# Patient Record
Sex: MALE | Race: WHITE | NOT HISPANIC OR LATINO | Employment: PART TIME | URBAN - METROPOLITAN AREA
[De-identification: names, ages, dates, MRNs, and addresses within clinical notes are randomized per-mention and may not be internally consistent; named-entity substitution may affect disease eponyms.]

---

## 2023-03-20 ENCOUNTER — APPOINTMENT (OUTPATIENT)
Dept: PRIMARY CARE | Facility: CLINIC | Age: 28
End: 2023-03-20
Payer: COMMERCIAL

## 2023-12-14 ENCOUNTER — OFFICE VISIT (OUTPATIENT)
Dept: PRIMARY CARE | Facility: CLINIC | Age: 28
End: 2023-12-14

## 2023-12-14 VITALS
TEMPERATURE: 97.9 F | RESPIRATION RATE: 18 BRPM | OXYGEN SATURATION: 98 % | BODY MASS INDEX: 21.5 KG/M2 | DIASTOLIC BLOOD PRESSURE: 72 MMHG | WEIGHT: 137 LBS | SYSTOLIC BLOOD PRESSURE: 116 MMHG | HEIGHT: 67 IN | HEART RATE: 68 BPM

## 2023-12-14 DIAGNOSIS — F98.8 ATTENTION DEFICIT DISORDER, UNSPECIFIED HYPERACTIVITY PRESENCE: Primary | ICD-10-CM

## 2023-12-14 PROCEDURE — 1036F TOBACCO NON-USER: CPT | Performed by: FAMILY MEDICINE

## 2023-12-14 PROCEDURE — 99213 OFFICE O/P EST LOW 20 MIN: CPT | Performed by: FAMILY MEDICINE

## 2023-12-14 RX ORDER — DEXTROAMPHETAMINE SULFATE, DEXTROAMPHETAMINE SACCHARATE, AMPHETAMINE SULFATE AND AMPHETAMINE ASPARTATE 7.5; 7.5; 7.5; 7.5 MG/1; MG/1; MG/1; MG/1
CAPSULE, EXTENDED RELEASE ORAL EVERY 24 HOURS
COMMUNITY
Start: 2023-02-09 | End: 2023-12-14 | Stop reason: SDUPTHER

## 2023-12-14 RX ORDER — DEXTROAMPHETAMINE SULFATE, DEXTROAMPHETAMINE SACCHARATE, AMPHETAMINE SULFATE AND AMPHETAMINE ASPARTATE 7.5; 7.5; 7.5; 7.5 MG/1; MG/1; MG/1; MG/1
30 CAPSULE, EXTENDED RELEASE ORAL EVERY 24 HOURS
Qty: 90 CAPSULE | Refills: 0 | Status: SHIPPED | OUTPATIENT
Start: 2023-12-31 | End: 2023-12-27 | Stop reason: SDUPTHER

## 2023-12-14 ASSESSMENT — PATIENT HEALTH QUESTIONNAIRE - PHQ9
SUM OF ALL RESPONSES TO PHQ9 QUESTIONS 1 AND 2: 0
1. LITTLE INTEREST OR PLEASURE IN DOING THINGS: NOT AT ALL
2. FEELING DOWN, DEPRESSED OR HOPELESS: NOT AT ALL

## 2023-12-14 ASSESSMENT — PAIN SCALES - GENERAL: PAINLEVEL: 0-NO PAIN

## 2023-12-14 NOTE — PROGRESS NOTES
"Subjective   Patient ID: Darryn Weber is a 28 y.o. male who presents for medication refills (Pt here for medication refills).    HPI     Review of Systems    Objective   /72 (BP Location: Left arm, Patient Position: Sitting, BP Cuff Size: Adult)   Pulse 68   Temp 36.6 °C (97.9 °F) (Temporal)   Resp 18   Ht 1.689 m (5' 6.5\")   Wt 62.1 kg (137 lb)   SpO2 98%   BMI 21.78 kg/m²     Physical Exam  Constitutional:       General: He is not in acute distress.     Appearance: Normal appearance.   Cardiovascular:      Rate and Rhythm: Normal rate and regular rhythm.      Heart sounds: No murmur heard.  Pulmonary:      Breath sounds: Normal breath sounds. No wheezing.   Neurological:      Mental Status: He is alert.         Assessment/Plan   Problem List Items Addressed This Visit             ICD-10-CM    Attention deficit disorder - Primary F98.8    Relevant Medications    Adderall XR 30 mg 24 hr capsule (Start on 12/31/2023)          "

## 2023-12-27 DIAGNOSIS — F98.8 ATTENTION DEFICIT DISORDER, UNSPECIFIED HYPERACTIVITY PRESENCE: ICD-10-CM

## 2023-12-29 RX ORDER — DEXTROAMPHETAMINE SULFATE, DEXTROAMPHETAMINE SACCHARATE, AMPHETAMINE SULFATE AND AMPHETAMINE ASPARTATE 7.5; 7.5; 7.5; 7.5 MG/1; MG/1; MG/1; MG/1
30 CAPSULE, EXTENDED RELEASE ORAL EVERY 24 HOURS
Qty: 90 CAPSULE | Refills: 0 | Status: SHIPPED | OUTPATIENT
Start: 2023-12-31

## 2023-12-30 RX ORDER — DEXTROAMPHETAMINE SACCHARATE, AMPHETAMINE ASPARTATE MONOHYDRATE, DEXTROAMPHETAMINE SULFATE AND AMPHETAMINE SULFATE 7.5; 7.5; 7.5; 7.5 MG/1; MG/1; MG/1; MG/1
30 CAPSULE, EXTENDED RELEASE ORAL
Qty: 90 CAPSULE | OUTPATIENT
Start: 2023-12-30

## 2024-01-02 DIAGNOSIS — F98.8 ATTENTION DEFICIT DISORDER, UNSPECIFIED HYPERACTIVITY PRESENCE: ICD-10-CM

## 2024-01-02 RX ORDER — DEXTROAMPHETAMINE SACCHARATE, AMPHETAMINE ASPARTATE MONOHYDRATE, DEXTROAMPHETAMINE SULFATE AND AMPHETAMINE SULFATE 7.5; 7.5; 7.5; 7.5 MG/1; MG/1; MG/1; MG/1
30 CAPSULE, EXTENDED RELEASE ORAL EVERY 24 HOURS
Qty: 90 CAPSULE | Refills: 0 | OUTPATIENT
Start: 2024-01-02

## 2024-01-05 DIAGNOSIS — F98.8 ATTENTION DEFICIT DISORDER, UNSPECIFIED HYPERACTIVITY PRESENCE: ICD-10-CM

## 2024-01-05 RX ORDER — DEXTROAMPHETAMINE SACCHARATE, AMPHETAMINE ASPARTATE MONOHYDRATE, DEXTROAMPHETAMINE SULFATE AND AMPHETAMINE SULFATE 7.5; 7.5; 7.5; 7.5 MG/1; MG/1; MG/1; MG/1
30 CAPSULE, EXTENDED RELEASE ORAL EVERY 24 HOURS
Qty: 90 CAPSULE | Refills: 0 | OUTPATIENT
Start: 2024-01-05

## 2024-12-24 ENCOUNTER — APPOINTMENT (OUTPATIENT)
Dept: RADIOLOGY | Facility: HOSPITAL | Age: 29
End: 2024-12-24
Payer: COMMERCIAL

## 2024-12-24 ENCOUNTER — HOSPITAL ENCOUNTER (EMERGENCY)
Facility: HOSPITAL | Age: 29
Discharge: HOME | End: 2024-12-24
Payer: COMMERCIAL

## 2024-12-24 VITALS
OXYGEN SATURATION: 100 % | DIASTOLIC BLOOD PRESSURE: 74 MMHG | SYSTOLIC BLOOD PRESSURE: 124 MMHG | RESPIRATION RATE: 16 BRPM | HEART RATE: 57 BPM | TEMPERATURE: 98.7 F | HEIGHT: 66 IN | BODY MASS INDEX: 21.69 KG/M2 | WEIGHT: 135 LBS

## 2024-12-24 DIAGNOSIS — S83.91XA SPRAIN OF RIGHT KNEE, INITIAL ENCOUNTER: Primary | ICD-10-CM

## 2024-12-24 DIAGNOSIS — S80.01XA CONTUSION OF RIGHT KNEE, INITIAL ENCOUNTER: ICD-10-CM

## 2024-12-24 PROCEDURE — 73564 X-RAY EXAM KNEE 4 OR MORE: CPT | Mod: RT

## 2024-12-24 PROCEDURE — 99283 EMERGENCY DEPT VISIT LOW MDM: CPT

## 2024-12-24 PROCEDURE — 2500000001 HC RX 250 WO HCPCS SELF ADMINISTERED DRUGS (ALT 637 FOR MEDICARE OP): Performed by: NURSE PRACTITIONER

## 2024-12-24 PROCEDURE — 73564 X-RAY EXAM KNEE 4 OR MORE: CPT | Mod: RIGHT SIDE | Performed by: RADIOLOGY

## 2024-12-24 RX ORDER — ACETAMINOPHEN 325 MG/1
975 TABLET ORAL ONCE
Status: COMPLETED | OUTPATIENT
Start: 2024-12-24 | End: 2024-12-24

## 2024-12-24 RX ORDER — IBUPROFEN 600 MG/1
600 TABLET ORAL ONCE
Status: COMPLETED | OUTPATIENT
Start: 2024-12-24 | End: 2024-12-24

## 2024-12-24 RX ORDER — TIZANIDINE 2 MG/1
4 TABLET ORAL 2 TIMES DAILY
Qty: 28 TABLET | Refills: 0 | Status: SHIPPED | OUTPATIENT
Start: 2024-12-24 | End: 2024-12-31

## 2024-12-24 RX ORDER — TIZANIDINE 2 MG/1
4 TABLET ORAL 2 TIMES DAILY
Qty: 28 TABLET | Refills: 0 | Status: SHIPPED | OUTPATIENT
Start: 2024-12-24 | End: 2024-12-24

## 2024-12-24 RX ORDER — NAPROXEN 500 MG/1
500 TABLET ORAL
Qty: 14 TABLET | Refills: 0 | Status: SHIPPED | OUTPATIENT
Start: 2024-12-24 | End: 2024-12-31

## 2024-12-24 RX ORDER — NAPROXEN 500 MG/1
500 TABLET ORAL
Qty: 14 TABLET | Refills: 0 | Status: SHIPPED | OUTPATIENT
Start: 2024-12-24 | End: 2024-12-24

## 2024-12-24 RX ORDER — DICLOFENAC SODIUM 10 MG/G
4 GEL TOPICAL 4 TIMES DAILY
Qty: 20 G | Refills: 0 | Status: SHIPPED | OUTPATIENT
Start: 2024-12-24

## 2024-12-24 RX ORDER — DICLOFENAC SODIUM 10 MG/G
4 GEL TOPICAL 4 TIMES DAILY
Qty: 20 G | Refills: 0 | Status: SHIPPED | OUTPATIENT
Start: 2024-12-24 | End: 2024-12-24

## 2024-12-24 RX ADMIN — IBUPROFEN 600 MG: 600 TABLET, FILM COATED ORAL at 08:21

## 2024-12-24 RX ADMIN — ACETAMINOPHEN 975 MG: 325 TABLET, FILM COATED ORAL at 08:21

## 2024-12-24 ASSESSMENT — PAIN - FUNCTIONAL ASSESSMENT: PAIN_FUNCTIONAL_ASSESSMENT: 0-10

## 2024-12-24 ASSESSMENT — PAIN DESCRIPTION - LOCATION: LOCATION: KNEE

## 2024-12-24 ASSESSMENT — PAIN DESCRIPTION - PAIN TYPE: TYPE: ACUTE PAIN

## 2024-12-24 ASSESSMENT — PAIN DESCRIPTION - DESCRIPTORS: DESCRIPTORS: ACHING

## 2024-12-24 ASSESSMENT — COLUMBIA-SUICIDE SEVERITY RATING SCALE - C-SSRS
2. HAVE YOU ACTUALLY HAD ANY THOUGHTS OF KILLING YOURSELF?: NO
1. IN THE PAST MONTH, HAVE YOU WISHED YOU WERE DEAD OR WISHED YOU COULD GO TO SLEEP AND NOT WAKE UP?: NO
6. HAVE YOU EVER DONE ANYTHING, STARTED TO DO ANYTHING, OR PREPARED TO DO ANYTHING TO END YOUR LIFE?: NO

## 2024-12-24 ASSESSMENT — PAIN DESCRIPTION - FREQUENCY: FREQUENCY: CONSTANT/CONTINUOUS

## 2024-12-24 ASSESSMENT — PAIN SCALES - GENERAL: PAINLEVEL_OUTOF10: 8

## 2024-12-24 ASSESSMENT — PAIN DESCRIPTION - ORIENTATION: ORIENTATION: RIGHT

## 2024-12-24 NOTE — DISCHARGE INSTRUCTIONS
We have an orthopedic walk-in clinic on the old side of our hospital second floor.  They are open Monday through Friday 8 30-4 30.  I have also requested an appointment with our on-call orthopedic surgeon.  She is fantastic.  Please use tizanidine, Naprosyn, and diclofenac cream for pain management.

## 2024-12-24 NOTE — Clinical Note
Darryn Weber was seen and treated in our emergency department on 12/24/2024.  He may return to work on 12/27/2024.       If you have any questions or concerns, please don't hesitate to call.      David Moses, APRN-CNP

## 2024-12-24 NOTE — ED PROVIDER NOTES
HPI   Chief Complaint   Patient presents with    Knee Pain       29-year-old male presents today with acute right knee injury when he was finishing work.  He slipped and fell landing and striking a table with his right knee.  He has been no weight or at the [employer] company for the last 3 years.  Is a job he thoroughly enjoys.  He denies paresthesia.  She endorses limited range of motion.  He denies any change in skin temperature or color but the anterior aspect of his right knee he endorses swelling.  He denies hip pain.  He denies ankle pain.  He denies foot pain.  He is not on any anticoagulant medication.  The only medication he is on is Adderall and he has been on Adderall most of his childhood and adult life.  He denies any alcohol, smoking, or drug abuse.      History provided by:  Patient   used: No            Patient History   Past Medical History:   Diagnosis Date    ADHD (attention deficit hyperactivity disorder)      No past surgical history on file.  Family History   Problem Relation Name Age of Onset    No Known Problems Mother      No Known Problems Father       Social History     Tobacco Use    Smoking status: Never    Smokeless tobacco: Never   Vaping Use    Vaping status: Never Used   Substance Use Topics    Alcohol use: Never    Drug use: Never       Physical Exam   ED Triage Vitals [12/24/24 0751]   Temperature Heart Rate Respirations BP   37.1 °C (98.7 °F) 57 16 124/74      Pulse Ox Temp Source Heart Rate Source Patient Position   100 % Temporal -- --      BP Location FiO2 (%)     -- --       Physical Exam  Constitutional:       Appearance: Normal appearance.   HENT:      Head: Normocephalic and atraumatic.   Cardiovascular:      Rate and Rhythm: Normal rate and regular rhythm.      Pulses: Normal pulses.      Heart sounds: Normal heart sounds.   Pulmonary:      Effort: Pulmonary effort is normal.      Breath sounds: Normal breath sounds.   Abdominal:      General: Abdomen  is flat.      Palpations: Abdomen is soft.   Musculoskeletal:         General: Swelling and tenderness present. Normal range of motion.      Cervical back: Normal range of motion and neck supple.      Comments: Drawer test negative.  Negative laxity.  There is swelling to the anterior aspect of the right patella.  Pedal posterior tibial pulse 2/2.  Cap refill less than 2 seconds   Skin:     General: Skin is warm.      Capillary Refill: Capillary refill takes less than 2 seconds.   Neurological:      General: No focal deficit present.      Mental Status: He is alert and oriented to person, place, and time.           ED Course & MDM   Diagnoses as of 12/24/24 1012   Sprain of right knee, initial encounter   Contusion of right knee, initial encounter                 No data recorded     [place] Coma Scale Score: 15 (12/24/24 0751 : Peña Lorenzana RN)                           Medical Decision Making  Patient received Motrin, Tylenol and ice.  I requested Ace wrap and crutches.  I do not believe patient could tolerate a knee immobilizer but I asked that he remain nonweightbearing.  His pedal and posterior tibial pulse were 2/2.  All Worker's Comp. paperwork completed.  He will use Motrin and Tylenol interchangeably I gave him tizanidine as a muscle relaxer and spoke to him about rest ice compression elevation.  Work note completed.There was no fracture or dislocation or bony abnormality on x-ray.  I requested an appointment with on-call orthopedic surgery and gave patient contact information.  I also discussed with patient our walk-in orthopedic clinic that is open Monday through Friday 8 30-4 30.  It is not open on holidays.    Amount and/or Complexity of Data Reviewed  Radiology: ordered and independent interpretation performed.        Procedure  Procedures     David Moses, FOUZIA-CNP  12/24/24 1012